# Patient Record
Sex: MALE | Race: WHITE | NOT HISPANIC OR LATINO | URBAN - METROPOLITAN AREA
[De-identification: names, ages, dates, MRNs, and addresses within clinical notes are randomized per-mention and may not be internally consistent; named-entity substitution may affect disease eponyms.]

---

## 2025-07-03 ENCOUNTER — OFFICE VISIT (OUTPATIENT)
Dept: URGENT CARE | Age: 35
End: 2025-07-03
Payer: COMMERCIAL

## 2025-07-03 VITALS
RESPIRATION RATE: 17 BRPM | TEMPERATURE: 97.6 F | OXYGEN SATURATION: 98 % | SYSTOLIC BLOOD PRESSURE: 147 MMHG | HEART RATE: 56 BPM | DIASTOLIC BLOOD PRESSURE: 90 MMHG

## 2025-07-03 DIAGNOSIS — T14.8XXA MUSCLE STRAIN: Primary | ICD-10-CM

## 2025-07-03 RX ORDER — PREDNISONE 20 MG/1
40 TABLET ORAL DAILY
Qty: 10 TABLET | Refills: 0 | Status: SHIPPED | OUTPATIENT
Start: 2025-07-03 | End: 2025-07-08

## 2025-07-03 ASSESSMENT — ENCOUNTER SYMPTOMS: BACK PAIN: 1

## 2025-07-03 NOTE — PROGRESS NOTES
Subjective   Patient ID: Ezequiel Bee is a 34 y.o. male. They present today with a chief complaint of Back Pain.    History of Present Illness    Back Pain        34-year-old male patient presents today with concerns of back pain.  He states that there has been no injury to the area, but his job is physical and he was lifting a lot by hand on Monday and started to notice the back pain yesterday.  He states that he is not having any saddle paresthesia or any issues with his bowel or bladder.  He states that sitting or laying for long periods of time seems to trigger the pain; he describes it as a dull ache.  He is here for evaluation.    Past Medical History  Allergies as of 07/03/2025    (No Known Allergies)       Prescriptions Prior to Admission[1]     Medical History[2]    Surgical History[3]         Review of Systems  Review of Systems   Musculoskeletal:  Positive for back pain.                                  Objective    Vitals:    07/03/25 1234   BP: 147/90   Pulse: 56   Resp: 17   Temp: 36.4 °C (97.6 °F)   TempSrc: Oral   SpO2: 98%     No LMP for male patient.    Physical Exam  Musculoskeletal:         General: Tenderness present. No signs of injury.      Comments: Back pain; triggered by movement         Procedures    Point of Care Test & Imaging Results from this visit  No results found for this visit on 07/03/25.   Imaging  No results found.    Cardiology, Vascular, and Other Imaging  No other imaging results found for the past 2 days      Diagnostic study results (if any) were reviewed by YOEL Huizar.    Assessment/Plan   Allergies, medications, history, and pertinent labs/EKGs/Imaging reviewed by YOEL Huizar.     Medical Decision Making  Based on patient's presenting symptoms and physical examination, I suspect he has a muscle strain of his back.  He is agreeable to short course of prednisone to help with any inflammation; I also recommend taking Tylenol  to help with discomfort.  I recommend going to the emergency department with any saddle paresthesia or loss of control of bowel or bladder as this may indicate a spinal cord injury. As a result of the work-up, the patient was discharged home.  he was informed of his diagnosis and instructed to come back with any concerns or worsening of condition.  he and was agreeable to the plan as discussed above.  he was given the opportunity to ask questions.  All of the patient's questions were answered.      Orders and Diagnoses  Diagnoses and all orders for this visit:  Muscle strain  -     predniSONE (Deltasone) 20 mg tablet; Take 2 tablets (40 mg) by mouth once daily for 5 days.      Medical Admin Record      Patient disposition: Home    Electronically signed by YOEL Huizar  12:43 PM           [1] (Not in a hospital admission)   [2]   Past Medical History:  Diagnosis Date    Personal history of other endocrine, nutritional and metabolic disease     History of obesity   [3]   Past Surgical History:  Procedure Laterality Date    KNEE ARTHROSCOPY W/ MENISCAL REPAIR  01/13/2016    Knee Arthroscopy With Lateral Meniscus Repair

## 2025-07-10 ENCOUNTER — HOSPITAL ENCOUNTER (EMERGENCY)
Facility: HOSPITAL | Age: 35
Discharge: HOME | End: 2025-07-10
Payer: COMMERCIAL

## 2025-07-10 ENCOUNTER — PHARMACY VISIT (OUTPATIENT)
Dept: PHARMACY | Facility: CLINIC | Age: 35
End: 2025-07-10
Payer: COMMERCIAL

## 2025-07-10 VITALS
RESPIRATION RATE: 16 BRPM | WEIGHT: 280 LBS | HEART RATE: 60 BPM | TEMPERATURE: 97.3 F | SYSTOLIC BLOOD PRESSURE: 150 MMHG | DIASTOLIC BLOOD PRESSURE: 96 MMHG | HEIGHT: 71 IN | BODY MASS INDEX: 39.2 KG/M2 | OXYGEN SATURATION: 98 %

## 2025-07-10 DIAGNOSIS — B02.9 HERPES ZOSTER WITHOUT COMPLICATION: Primary | ICD-10-CM

## 2025-07-10 PROCEDURE — 99283 EMERGENCY DEPT VISIT LOW MDM: CPT

## 2025-07-10 PROCEDURE — RXMED WILLOW AMBULATORY MEDICATION CHARGE

## 2025-07-10 RX ORDER — NALOXONE HYDROCHLORIDE 4 MG/.1ML
SPRAY NASAL
Qty: 2 EACH | Refills: 0 | OUTPATIENT
Start: 2025-07-10

## 2025-07-10 RX ORDER — HYDROCODONE BITARTRATE AND ACETAMINOPHEN 5; 325 MG/1; MG/1
1 TABLET ORAL EVERY 4 HOURS PRN
Qty: 17 TABLET | Refills: 0 | Status: SHIPPED | OUTPATIENT
Start: 2025-07-10 | End: 2025-07-13

## 2025-07-10 RX ORDER — ACYCLOVIR 50 MG/G
CREAM TOPICAL
Qty: 5 G | Refills: 0 | Status: SHIPPED | OUTPATIENT
Start: 2025-07-10

## 2025-07-10 ASSESSMENT — LIFESTYLE VARIABLES
HAVE YOU EVER FELT YOU SHOULD CUT DOWN ON YOUR DRINKING: NO
TOTAL SCORE: 0
HAVE PEOPLE ANNOYED YOU BY CRITICIZING YOUR DRINKING: NO
EVER FELT BAD OR GUILTY ABOUT YOUR DRINKING: NO
EVER HAD A DRINK FIRST THING IN THE MORNING TO STEADY YOUR NERVES TO GET RID OF A HANGOVER: NO

## 2025-07-10 ASSESSMENT — PAIN DESCRIPTION - PAIN TYPE: TYPE: ACUTE PAIN

## 2025-07-10 ASSESSMENT — PAIN SCALES - GENERAL: PAINLEVEL_OUTOF10: 2

## 2025-07-10 ASSESSMENT — PAIN DESCRIPTION - LOCATION: LOCATION: BACK

## 2025-07-10 ASSESSMENT — PAIN - FUNCTIONAL ASSESSMENT: PAIN_FUNCTIONAL_ASSESSMENT: 0-10

## 2025-07-10 NOTE — ED PROVIDER NOTES
"Chief Complaint   Patient presents with    Back Pain     R low back/flank x1 week. Seen a UC and given steroids for 5 days, no relief. Non-radiating, dull pain that \"feels deeper than muscle\", worse when laying down. Denies urinary/bowel problems. Denies N/V/D       HPI       34 year old male presents to the Emergency Department today complaining of a 1 week history of right flank pain that he describes as dull in nature, constant, non-radiating, and varies in intensity. Notes that he was seen at an urgent care and placed on steroids with slight improvement. Notes that the pain has not improved and was advised to get re-evaluated. States that he noticed a rash to the anterior chest 4-5 days ago. Denies any associated fever, chills, headache, neck pain, chest pain, shortness of breath, abdominal pain, nausea, vomiting, diarrhea, constipation, or urinary symptoms.       History provided by:  Patient             Patient History   Medical History[1]  Surgical History[2]  Family History[3]  Social History[4]        Physical Exam  Constitutional:       General: He is awake.      Appearance: Normal appearance.   Cardiovascular:      Rate and Rhythm: Normal rate and regular rhythm.      Pulses:           Radial pulses are 3+ on the right side and 3+ on the left side.      Heart sounds: Normal heart sounds. No murmur heard.     No friction rub. No gallop.   Pulmonary:      Effort: Pulmonary effort is normal.      Breath sounds: Normal breath sounds and air entry.   Skin:     Comments: Vesicular rash in a dermatomal pattern that is consistent with shingles.    Neurological:      Mental Status: He is alert.   Psychiatric:         Behavior: Behavior is cooperative.         Labs Reviewed - No data to display    No orders to display            ED Course & MDM   Diagnoses as of 07/10/25 1021   Herpes zoster without complication           Medical Decision Making  Patient was seen and evaluated by myself. At first, I was concerned " about a kidney stone or muscular injury. Then, I noted that he had a vesicular rash in a dermatomal pattern that is consistent with shingles. He is too late to oral antiviral medications. Given prescriptions for Zovirax and Norco. Follow up with their doctor in 3 days. Return if worse in any way. Discharged in stable condition with computer instructions.    Diagnostic Impression:     1. Acute shingles    2. Prescription therapy            Your medication list        ASK your doctor about these medications        Instructions Last Dose Given Next Dose Due   predniSONE 20 mg tablet  Commonly known as: Deltasone  Ask about: Should I take this medication?      Take 2 tablets (40 mg) by mouth once daily for 5 days.                  Procedure  Procedures       [1]   Past Medical History:  Diagnosis Date    Personal history of other endocrine, nutritional and metabolic disease     History of obesity   [2]   Past Surgical History:  Procedure Laterality Date    KNEE ARTHROSCOPY W/ MENISCAL REPAIR  01/13/2016    Knee Arthroscopy With Lateral Meniscus Repair   [3] No family history on file.  [4]   Social History  Tobacco Use    Smoking status: Unknown    Smokeless tobacco: Not on file   Substance Use Topics    Alcohol use: Never    Drug use: Never        NEIL Weir-CNP  07/10/25 1021

## 2025-09-15 ENCOUNTER — APPOINTMENT (OUTPATIENT)
Dept: PRIMARY CARE | Facility: CLINIC | Age: 35
End: 2025-09-15
Payer: COMMERCIAL